# Patient Record
Sex: MALE | Race: WHITE | NOT HISPANIC OR LATINO | Employment: UNEMPLOYED | URBAN - METROPOLITAN AREA
[De-identification: names, ages, dates, MRNs, and addresses within clinical notes are randomized per-mention and may not be internally consistent; named-entity substitution may affect disease eponyms.]

---

## 2019-04-06 ENCOUNTER — APPOINTMENT (OUTPATIENT)
Dept: RADIOLOGY | Facility: MEDICAL CENTER | Age: 29
End: 2019-04-06
Attending: EMERGENCY MEDICINE

## 2019-04-06 ENCOUNTER — HOSPITAL ENCOUNTER (EMERGENCY)
Facility: MEDICAL CENTER | Age: 29
End: 2019-04-06
Attending: EMERGENCY MEDICINE

## 2019-04-06 VITALS
BODY MASS INDEX: 24.38 KG/M2 | SYSTOLIC BLOOD PRESSURE: 123 MMHG | RESPIRATION RATE: 18 BRPM | DIASTOLIC BLOOD PRESSURE: 76 MMHG | TEMPERATURE: 98.4 F | HEART RATE: 65 BPM | WEIGHT: 174.16 LBS | OXYGEN SATURATION: 95 % | HEIGHT: 71 IN

## 2019-04-06 DIAGNOSIS — M25.571 ACUTE RIGHT ANKLE PAIN: ICD-10-CM

## 2019-04-06 LAB
ALBUMIN SERPL BCP-MCNC: 4.8 G/DL (ref 3.2–4.9)
ALBUMIN/GLOB SERPL: 1.6 G/DL
ALP SERPL-CCNC: 94 U/L (ref 30–99)
ALT SERPL-CCNC: 15 U/L (ref 2–50)
ANION GAP SERPL CALC-SCNC: 9 MMOL/L (ref 0–11.9)
AST SERPL-CCNC: 14 U/L (ref 12–45)
BASOPHILS # BLD AUTO: 0.5 % (ref 0–1.8)
BASOPHILS # BLD: 0.04 K/UL (ref 0–0.12)
BILIRUB SERPL-MCNC: 0.3 MG/DL (ref 0.1–1.5)
BUN SERPL-MCNC: 26 MG/DL (ref 8–22)
CALCIUM SERPL-MCNC: 9.4 MG/DL (ref 8.5–10.5)
CHLORIDE SERPL-SCNC: 106 MMOL/L (ref 96–112)
CO2 SERPL-SCNC: 26 MMOL/L (ref 20–33)
CREAT SERPL-MCNC: 0.92 MG/DL (ref 0.5–1.4)
CRP SERPL HS-MCNC: 0.4 MG/DL (ref 0–0.75)
EOSINOPHIL # BLD AUTO: 0.14 K/UL (ref 0–0.51)
EOSINOPHIL NFR BLD: 1.6 % (ref 0–6.9)
ERYTHROCYTE [DISTWIDTH] IN BLOOD BY AUTOMATED COUNT: 41.3 FL (ref 35.9–50)
ERYTHROCYTE [SEDIMENTATION RATE] IN BLOOD BY WESTERGREN METHOD: 13 MM/HOUR (ref 0–15)
GLOBULIN SER CALC-MCNC: 3 G/DL (ref 1.9–3.5)
GLUCOSE SERPL-MCNC: 94 MG/DL (ref 65–99)
HCT VFR BLD AUTO: 39 % (ref 42–52)
HGB BLD-MCNC: 13.4 G/DL (ref 14–18)
IMM GRANULOCYTES # BLD AUTO: 0.03 K/UL (ref 0–0.11)
IMM GRANULOCYTES NFR BLD AUTO: 0.3 % (ref 0–0.9)
LYMPHOCYTES # BLD AUTO: 2.7 K/UL (ref 1–4.8)
LYMPHOCYTES NFR BLD: 31 % (ref 22–41)
MCH RBC QN AUTO: 30.9 PG (ref 27–33)
MCHC RBC AUTO-ENTMCNC: 34.4 G/DL (ref 33.7–35.3)
MCV RBC AUTO: 90.1 FL (ref 81.4–97.8)
MONOCYTES # BLD AUTO: 0.54 K/UL (ref 0–0.85)
MONOCYTES NFR BLD AUTO: 6.2 % (ref 0–13.4)
NEUTROPHILS # BLD AUTO: 5.26 K/UL (ref 1.82–7.42)
NEUTROPHILS NFR BLD: 60.4 % (ref 44–72)
NRBC # BLD AUTO: 0 K/UL
NRBC BLD-RTO: 0 /100 WBC
PLATELET # BLD AUTO: 243 K/UL (ref 164–446)
PMV BLD AUTO: 10.1 FL (ref 9–12.9)
POTASSIUM SERPL-SCNC: 4.3 MMOL/L (ref 3.6–5.5)
PROT SERPL-MCNC: 7.8 G/DL (ref 6–8.2)
RBC # BLD AUTO: 4.33 M/UL (ref 4.7–6.1)
SODIUM SERPL-SCNC: 141 MMOL/L (ref 135–145)
WBC # BLD AUTO: 8.7 K/UL (ref 4.8–10.8)

## 2019-04-06 PROCEDURE — 87040 BLOOD CULTURE FOR BACTERIA: CPT

## 2019-04-06 PROCEDURE — 700111 HCHG RX REV CODE 636 W/ 250 OVERRIDE (IP): Performed by: EMERGENCY MEDICINE

## 2019-04-06 PROCEDURE — 85652 RBC SED RATE AUTOMATED: CPT

## 2019-04-06 PROCEDURE — 73610 X-RAY EXAM OF ANKLE: CPT | Mod: RT

## 2019-04-06 PROCEDURE — 99285 EMERGENCY DEPT VISIT HI MDM: CPT

## 2019-04-06 PROCEDURE — 80053 COMPREHEN METABOLIC PANEL: CPT

## 2019-04-06 PROCEDURE — 36415 COLL VENOUS BLD VENIPUNCTURE: CPT

## 2019-04-06 PROCEDURE — 96374 THER/PROPH/DIAG INJ IV PUSH: CPT

## 2019-04-06 PROCEDURE — 85025 COMPLETE CBC W/AUTO DIFF WBC: CPT

## 2019-04-06 PROCEDURE — 86140 C-REACTIVE PROTEIN: CPT

## 2019-04-06 RX ORDER — KETOROLAC TROMETHAMINE 30 MG/ML
30 INJECTION, SOLUTION INTRAMUSCULAR; INTRAVENOUS ONCE
Status: COMPLETED | OUTPATIENT
Start: 2019-04-06 | End: 2019-04-06

## 2019-04-06 RX ORDER — IBUPROFEN 600 MG/1
600 TABLET ORAL EVERY 6 HOURS PRN
Qty: 15 TAB | Refills: 0 | Status: SHIPPED | OUTPATIENT
Start: 2019-04-06 | End: 2019-04-11

## 2019-04-06 RX ADMIN — KETOROLAC TROMETHAMINE 30 MG: 30 INJECTION, SOLUTION INTRAMUSCULAR; INTRAVENOUS at 14:50

## 2019-04-06 NOTE — ED PROVIDER NOTES
"ED Provider Note    Scribed for Emmie Decker D.O. by David Mancilla. 4/6/2019, 12:25 PM.    Primary care provider: No primary care provider on file.  Means of arrival: Walk-In  History obtained from: Patient  History limited by: None    CHIEF COMPLAINT  Chief Complaint   Patient presents with   • Ankle Pain     rt, +swelling, pain w/ walking, x1wk, states he may have injured it while working out       [facility]  Mansoor Das is a 29 y.o. male who presents to the Emergency Department evaluation of right ankle pain and swelling which began one week ago. He states that he has had some difficulty walking, and he believes he sustained the injury after walking around when he heard a pop and began experiencing some pain and difficulty walking. The pop was felt more proximal than his ankle but the pain a localized to his ankle. He denies any similar experience with this and he did not experience any falls. He denies any fevers, abdominal pain, nausea, vomiting, diarrhea, coughing, congestion, and any recent sick contact. He admits to heroin use in the past but he is currently in rehabilitation and he has been sober for 4 months now. He states he is fairly active and he has no history of blood clots.     REVIEW OF SYSTEMS  See HPI for further details. All other systems are negative.     PAST MEDICAL HISTORY  None noted.     SURGICAL HISTORY  patient denies any surgical history    SOCIAL HISTORY  Social History   Substance Use Topics   • Smoking status: Current Every Day Smoker   • Smokeless tobacco: Not on file   • Alcohol use No      History   Drug Use No     Comment: hx heroin use     FAMILY HISTORY  History reviewed. No pertinent family history.    CURRENT MEDICATIONS  Reviewed.  See Encounter Summary.     ALLERGIES  No Known Allergies    PHYSICAL EXAM  VITAL SIGNS: /73   Pulse 71   Temp 36.3 °C (97.3 °F) (Temporal)   Resp 20   Ht 1.803 m (5' 11\")   Wt 79 kg (174 lb 2.6 oz)   SpO2 95%   BMI 24.29 kg/m² "   Constitutional: Alert and in no apparent distress.  HENT: Normocephalic atraumatic. Bilateral external ears normal. Nose normal. Mucous membranes are moist.  Eyes: Pupils are equal and reactive. Conjunctiva normal. Non-icteric sclera.   Neck: Normal range of motion without tenderness. Supple. No meningeal signs.  Cardiovascular: Regular rate and rhythm. No murmurs, gallops or rubs.  Thorax & Lungs: Breath sounds are clear to auscultation bilaterally. No wheezing, rhonchi or rales.  Abdomen: Soft, nontender and nondistended. No peritoneal signs noted.  Skin: Warm and dry. No rashes are noted.  Back: No bony tenderness, No CVA tenderness.   Extremities: 2+ peripheral pulses. Cap refill is less than 2 seconds. No edema, cyanosis, or clubbing.  Musculoskeletal: Erythema and swelling of right ankle with  ROM secondary to discomfort. Dorsiflex and Plantar Flex slightly. No calf tenderness. 2+ dorsalis pedis pulses. Tenderness over medial malleolus.   Neurologic: Alert and oriented ×3. The patient moves all 4 extremities and follows commands.  Psychiatric: Affect is normal. Judgment appears to be intact.    LABS  Results for orders placed or performed during the hospital encounter of 19   CBC WITH DIFFERENTIAL   Result Value Ref Range    WBC 8.7 4.8 - 10.8 K/uL    RBC 4.33 (L) 4.70 - 6.10 M/uL    Hemoglobin 13.4 (L) 14.0 - 18.0 g/dL    Hematocrit 39.0 (L) 42.0 - 52.0 %    MCV 90.1 81.4 - 97.8 fL    MCH 30.9 27.0 - 33.0 pg    MCHC 34.4 33.7 - 35.3 g/dL    RDW 41.3 35.9 - 50.0 fL    Platelet Count 243 164 - 446 K/uL    MPV 10.1 9.0 - 12.9 fL    Neutrophils-Polys 60.40 44.00 - 72.00 %    Lymphocytes 31.00 22.00 - 41.00 %    Monocytes 6.20 0.00 - 13.40 %    Eosinophils 1.60 0.00 - 6.90 %    Basophils 0.50 0.00 - 1.80 %    Immature Granulocytes 0.30 0.00 - 0.90 %    Nucleated RBC 0.00 /100 WBC    Neutrophils (Absolute) 5.26 1.82 - 7.42 K/uL    Lymphs (Absolute) 2.70 1.00 - 4.80 K/uL    Monos (Absolute) 0.54 0.00  - 0.85 K/uL    Eos (Absolute) 0.14 0.00 - 0.51 K/uL    Baso (Absolute) 0.04 0.00 - 0.12 K/uL    Immature Granulocytes (abs) 0.03 0.00 - 0.11 K/uL    NRBC (Absolute) 0.00 K/uL   COMP METABOLIC PANEL   Result Value Ref Range    Sodium 141 135 - 145 mmol/L    Potassium 4.3 3.6 - 5.5 mmol/L    Chloride 106 96 - 112 mmol/L    Co2 26 20 - 33 mmol/L    Anion Gap 9.0 0.0 - 11.9    Glucose 94 65 - 99 mg/dL    Bun 26 (H) 8 - 22 mg/dL    Creatinine 0.92 0.50 - 1.40 mg/dL    Calcium 9.4 8.5 - 10.5 mg/dL    AST(SGOT) 14 12 - 45 U/L    ALT(SGPT) 15 2 - 50 U/L    Alkaline Phosphatase 94 30 - 99 U/L    Total Bilirubin 0.3 0.1 - 1.5 mg/dL    Albumin 4.8 3.2 - 4.9 g/dL    Total Protein 7.8 6.0 - 8.2 g/dL    Globulin 3.0 1.9 - 3.5 g/dL    A-G Ratio 1.6 g/dL   CRP QUANTITIVE (NON-CARDIAC)   Result Value Ref Range    Stat C-Reactive Protein 0.40 0.00 - 0.75 mg/dL   WESTERGREN SED RATE   Result Value Ref Range    Sed Rate Westergren 13 0 - 15 mm/hour   ESTIMATED GFR   Result Value Ref Range    GFR If African American >60 >60 mL/min/1.73 m 2    GFR If Non African American >60 >60 mL/min/1.73 m 2   All labs were reviewed by me.    RADIOLOGY  DX-ANKLE 3+ VIEWS RIGHT   Final Result      No fracture or dislocation of LEFT ankle.        The radiologist's interpretation of all radiological studies have been reviewed by me.    COURSE & MEDICAL DECISION MAKING  Pertinent Labs & Imaging studies reviewed. (See chart for details)    12:25 PM - Patient seen and examined at bedside. Ordered DX ankle, CBC, CMP, CRP, Westergren Sed Rate, Blood Culture to evaluate his symptoms.     Decision Making:  This is a 29 y.o. year old male who presents with right sided ankle pain.  On initial evaluation, the patient appeared well and in no acute distress.  He was noted to have mild swelling and some redness over the left ankle but no significant warmth was noted.  He was able to dorsiflex and plantar flex but these motions were diminished secondary to discomfort.   Passively I could not prove the range of motion.  Given his history of IV drug abuse, I did consider septic arthritis and obtained a CBC as well as CRP and sed rate.  White count and his inflammatory markers were all completely normal.  Given his lack of fever, ability to move the joint without significant tenderness, and lack of warmth over the joint itself, I am less concern for septic arthritis at this time.  I did also obtain a x-ray of the ankle which did not reveal any fractures or dislocations.  He did not have any calf swelling or tenderness and I have very low clinical suspicion for DVT.  He had no tenderness over his foot and I have low clinical suspicion for occult foot injury.  I think his clinical presentation is most consistent with an ankle sprain and/or strain.  He was given an Aircast and ibuprofen for pain.  Upon reevaluation, he was resting comfortably and again in no acute distress.  I encouraged him to follow-up with his primary care physician and he was referred to the [facility] clinic.  He understands return to the ED with any worsening signs or symptoms.    DISPOSITION:  Patient will be discharged home in stable condition.    FOLLOW UP:  [facility]  [address] [id number] [phone]  Call in 1 day  To schedule a follow up appointment    [facility], Emergency Dept  [address] [facility] [address] [phone]  Go to   As needed if your pain becomes worse, if you develop a fever, or if you have numbness of your foot.      OUTPATIENT MEDICATIONS:  New Prescriptions    IBUPROFEN (MOTRIN) 600 MG TAB    Take 1 Tab by mouth every 6 hours as needed for Mild Pain or Moderate Pain for up to 5 days.       FINAL IMPRESSION  1. Acute right ankle pain          David HEIN (Divya), am scribing for, and in the presence of, Emmie Decker D.O..    Electronically signed by: David Mancilla (Divya), 4/6/2019    Emmie HEIN D.O. personally  performed the services described in this documentation, as scribed by David Mancilla in my presence, and it is both accurate and complete.    C    The note accurately reflects work and decisions made by me.  Emmie Decker  4/6/2019  3:38 PM

## 2019-04-06 NOTE — ED NOTES
Vital signs rechecked. 2nd set of blood cultures set to er, drawn from right arm   Portable xray completed

## 2019-04-06 NOTE — ED NOTES
Pt given d/c instructions, f/u info and RX x 1 with verbal understanding.  Discussed use of ice and heat for treatment.  VSS at discharge.  PIV d/c'd with tip intact.  Pt ambulatory from the ED w/ steady gait.  All belongings in possession on discharge.  Pt escorted to the lobby by RN.

## 2019-04-06 NOTE — ED TRIAGE NOTES
Pt amb to triage w/ a limp.  Chief Complaint   Patient presents with   • Ankle Pain     rt, +swelling, pain w/ walking, x1wk, states he may have injured it while working out

## 2019-04-11 LAB
BACTERIA BLD CULT: NORMAL
BACTERIA BLD CULT: NORMAL
SIGNIFICANT IND 70042: NORMAL
SIGNIFICANT IND 70042: NORMAL
SITE SITE: NORMAL
SITE SITE: NORMAL
SOURCE SOURCE: NORMAL
SOURCE SOURCE: NORMAL

## 2021-07-15 ENCOUNTER — HOSPITAL ENCOUNTER (EMERGENCY)
Facility: MEDICAL CENTER | Age: 31
End: 2021-07-15
Attending: EMERGENCY MEDICINE

## 2021-07-15 VITALS
HEIGHT: 74 IN | BODY MASS INDEX: 23.57 KG/M2 | RESPIRATION RATE: 14 BRPM | HEART RATE: 71 BPM | SYSTOLIC BLOOD PRESSURE: 124 MMHG | DIASTOLIC BLOOD PRESSURE: 58 MMHG | WEIGHT: 183.64 LBS | TEMPERATURE: 98.6 F | OXYGEN SATURATION: 95 %

## 2021-07-15 DIAGNOSIS — S91.331A PUNCTURE WOUND OF RIGHT FOOT, INITIAL ENCOUNTER: ICD-10-CM

## 2021-07-15 PROCEDURE — 90715 TDAP VACCINE 7 YRS/> IM: CPT | Performed by: EMERGENCY MEDICINE

## 2021-07-15 PROCEDURE — A9270 NON-COVERED ITEM OR SERVICE: HCPCS | Performed by: EMERGENCY MEDICINE

## 2021-07-15 PROCEDURE — 700102 HCHG RX REV CODE 250 W/ 637 OVERRIDE(OP): Performed by: EMERGENCY MEDICINE

## 2021-07-15 PROCEDURE — 700111 HCHG RX REV CODE 636 W/ 250 OVERRIDE (IP): Performed by: EMERGENCY MEDICINE

## 2021-07-15 PROCEDURE — 90471 IMMUNIZATION ADMIN: CPT

## 2021-07-15 PROCEDURE — 99283 EMERGENCY DEPT VISIT LOW MDM: CPT

## 2021-07-15 RX ORDER — CIPROFLOXACIN 500 MG/1
500 TABLET, FILM COATED ORAL ONCE
Status: COMPLETED | OUTPATIENT
Start: 2021-07-15 | End: 2021-07-15

## 2021-07-15 RX ORDER — CIPROFLOXACIN 500 MG/1
500 TABLET, FILM COATED ORAL 2 TIMES DAILY
Qty: 14 TABLET | Refills: 0 | Status: SHIPPED | OUTPATIENT
Start: 2021-07-15 | End: 2021-07-22

## 2021-07-15 RX ADMIN — CIPROFLOXACIN 500 MG: 500 TABLET, FILM COATED ORAL at 13:45

## 2021-07-15 RX ADMIN — CLOSTRIDIUM TETANI TOXOID ANTIGEN (FORMALDEHYDE INACTIVATED), CORYNEBACTERIUM DIPHTHERIAE TOXOID ANTIGEN (FORMALDEHYDE INACTIVATED), BORDETELLA PERTUSSIS TOXOID ANTIGEN (GLUTARALDEHYDE INACTIVATED), BORDETELLA PERTUSSIS FILAMENTOUS HEMAGGLUTININ ANTIGEN (FORMALDEHYDE INACTIVATED), BORDETELLA PERTUSSIS PERTACTIN ANTIGEN, AND BORDETELLA PERTUSSIS FIMBRIAE 2/3 ANTIGEN 0.5 ML: 5; 2; 2.5; 5; 3; 5 INJECTION, SUSPENSION INTRAMUSCULAR at 13:36

## 2021-07-15 NOTE — ED PROVIDER NOTES
"ED Provider Note    CHIEF COMPLAINT  Chief Complaint   Patient presents with   • Foot Pain     pt presents to the ED following stepping on nail;  Pt states he washed out wound with peroxide following incident.    • Immunizations     Pt reports he is here to obtain tetnus shot.       HPI  Chin Das is a 31 y.o. male who ambulates to the emergency department through triage for foot injury.  Patient states he was at work and stepped on a pallet of which had exposed nails.  2 nails punctured his shoe, 1 into the sole of his foot.  This was removed in tact.  Small bleeding wound but this is controlled prior to arrival.  The wound has been cleansed as well.  Denies ongoing pain, denies difficulty walking.    Last tetanus shot 6 years ago.    REVIEW OF SYSTEMS  See HPI for further details. All other systems are negative.     PAST MEDICAL HISTORY   Denies    SOCIAL HISTORY  Social History     Tobacco Use   • Smoking status: Current Every Day Smoker   Substance and Sexual Activity   • Alcohol use: No   • Drug use: No     Comment: hx heroin use   • Sexual activity: Not on file       SURGICAL HISTORY  patient denies any surgical history    CURRENT MEDICATIONS  Home Medications     Reviewed by Olga Turcios R.N. (Registered Nurse) on 07/15/21 at 1212  Med List Status: Complete   Medication Last Dose Status        Patient Naeem Taking any Medications                   Denies    ALLERGIES  No Known Allergies    PHYSICAL EXAM  VITAL SIGNS: /77   Pulse 81   Temp 37 °C (98.6 °F) (Temporal)   Resp 14   Ht 1.88 m (6' 2\")   Wt 83.3 kg (183 lb 10.3 oz)   SpO2 95%   BMI 23.58 kg/m²   Pulse ox interpretation: I interpret this pulse ox as normal.  Constitutional: Alert in no apparent distress.  HENT: Normocephalic, atraumatic. Bilateral external ears normal, Nose normal.   Eyes:Conjunctiva normal.   Neck: Normal range of motion   Cardiovascular: normal peripheral perfusion.  Thorax & Lungs: Nonlabored " respirations.  Skin: Warm, Dry  Musculoskeletal: Small puncture wound to the mid sole of the right foot.  No active bleeding, hematoma or other drainage.  No crepitus or deformity.  No discomfort with palpation.  Otherwise good range of motion in all major joints. No major deformities noted.   Neurologic: Alert and orient x4.  Moves her extremity spontaneously.  Psychiatric: Affect normal, Judgment normal, Mood normal.     COURSE & MEDICAL DECISION MAKING  Nursing notes and vital signs were reviewed. (See chart for details)  The patients records were reviewed, history was obtained from the patient;    Patient presents with puncture wound to the right foot, after stepping on a nail with his tennis shoes on.  Nail was removed intact.  Puncture wound only, no active bleeding or hematoma.  No other trauma, injury, deformity.  CMS intact distally.  Patient bears weight without difficulty.  Wound has been cleansed and dressed again, covered with antibiotic ointment and Band-Aid.  First dose ciprofloxacin in the emergency department.  Tetanus has been updated as well.    Patient is stable for discharge at this time, anticipatory guidance and wound care instructions provided, ciprofloxacin for 7 days, Tylenol or ibuprofen for discomfort, close follow-up is encouraged, and strict ED return instructions have been detailed. Patient is agreeable to the disposition and plan.    Patient's blood pressure was elevated in the emergency department, and has been referred to primary care for close monitoring.      FINAL IMPRESSION  (S91.331A) Puncture wound of right foot, initial encounter      Electronically signed by: Lizbeth Argueta D.O., 7/15/2021 1:26 PM      This dictation was created using voice recognition software. The accuracy of the dictation is limited to the abilities of the software. I expect there may be some errors of grammar and possibly content. The nursing notes were reviewed and certain aspects of this information  were incorporated into this note.

## 2021-07-15 NOTE — ED NOTES
Patient from lob to [facility] 77 ambulatory with steady gait accompanied by ED Tech. Chart up for ERP.

## 2021-07-15 NOTE — DISCHARGE INSTRUCTIONS
Follow-up with primary care or ED in 48 hours for reevaluation and wound check.    Ciprofloxacin twice daily for 7 days.  Call or visit [facility], [facility] to see if this prescription is available for discounted rate, $4.  You may also contact [facility] as they may be able to provide this antibiotic for you.    Soak foot in warm soapy water 2 times daily.  Apply antibiotic ointment twice daily.  Keep covered while outdoors, weightbearing or active.    Tylenol ibuprofen for discomfort.    Return to the emergency department for increased pain, swelling, redness, bleeding or other drainage, fever or other new concerns.

## 2021-07-15 NOTE — ED TRIAGE NOTES
Chin Das  31 y.o. male  Chief Complaint   Patient presents with   • Foot Pain     pt presents to the ED following stepping on nail;  Pt states he washed out wound with peroxide following incident.    • Immunizations     Pt reports he is here to obtain tetnus shot.       Pt amb to triage with steady gait for above complaint.   Pt is alert and oriented, speaking in full sentences, follows commands and responds appropriately to questions. Resp are even and unlabored. No behavioral indicators of pain.   Pt placed in lobby. Pt educated on triage process. Pt encouraged to alert staff for any changes.

## 2021-07-15 NOTE — ED NOTES
Patient A & O x 4, GCS 15. Noted with puncture wound to bottom of R foot - bleeding controlled PTA with band-aid in place. Denies pain to site. Able to ambulate without difficulty. Last Tdap unknown.

## 2022-10-28 NOTE — ED NOTES
PIV has been placed.   Blood sent to the lab.    coming to draw 2nd set.  Pt awaiting x-ray.  Updated on POC, all need met.  Call light bedside.   Cantharidin Counseling: Calcipotriene Counseling:  I discussed with the patient the risks of calcipotriene including but not limited to erythema, scaling, itching, and irritation.